# Patient Record
Sex: MALE
[De-identification: names, ages, dates, MRNs, and addresses within clinical notes are randomized per-mention and may not be internally consistent; named-entity substitution may affect disease eponyms.]

---

## 2021-08-02 ENCOUNTER — HOSPITAL ENCOUNTER (OUTPATIENT)
Dept: HOSPITAL 53 - M OUTALCOH | Age: 46
End: 2021-08-02
Attending: PSYCHIATRY & NEUROLOGY
Payer: COMMERCIAL

## 2021-08-02 DIAGNOSIS — F10.10: Primary | ICD-10-CM

## 2021-08-31 ENCOUNTER — HOSPITAL ENCOUNTER (OUTPATIENT)
Dept: HOSPITAL 53 - M OUTALCOH | Age: 46
End: 2021-08-31
Attending: PSYCHIATRY & NEUROLOGY
Payer: COMMERCIAL

## 2021-08-31 DIAGNOSIS — F10.20: Primary | ICD-10-CM

## 2021-11-15 ENCOUNTER — HOSPITAL ENCOUNTER (OUTPATIENT)
Dept: HOSPITAL 53 - M WUC | Age: 46
End: 2021-11-15
Attending: PHYSICIAN ASSISTANT
Payer: COMMERCIAL

## 2021-11-15 DIAGNOSIS — M54.50: Primary | ICD-10-CM

## 2021-11-15 NOTE — REP
INDICATION:

LOW BACK PAIN.



COMPARISON:

None.



TECHNIQUE:

Five views



FINDINGS:

There is moderate disc space narrowing at every level.  There is anterior lipping at

every level.  Degenerative facet joint changes seen bilaterally at every level.

Vertebral body height and alignment is within normal limits.  There is marginal

osteophytosis seen scattered bilaterally.  This is heaviest on the right at L3-4 and

L4-5.  Due to positioning, spondylolysis cannot be accurately evaluated for.





IMPRESSION:

Chronic changes as described above with exam limitations.





<Electronically signed by Nate Hays > 11/15/21 [id number]

## 2021-12-07 ENCOUNTER — HOSPITAL ENCOUNTER (EMERGENCY)
Dept: HOSPITAL 53 - M ED | Age: 46
LOS: 1 days | Discharge: HOME | End: 2021-12-08
Payer: COMMERCIAL

## 2021-12-07 VITALS — WEIGHT: 315 LBS | BODY MASS INDEX: 47.74 KG/M2 | HEIGHT: 68 IN

## 2021-12-07 DIAGNOSIS — I45.19: ICD-10-CM

## 2021-12-07 DIAGNOSIS — J44.1: Primary | ICD-10-CM

## 2021-12-07 DIAGNOSIS — R06.02: ICD-10-CM

## 2021-12-07 DIAGNOSIS — Z87.01: ICD-10-CM

## 2021-12-07 PROCEDURE — 80076 HEPATIC FUNCTION PANEL: CPT

## 2021-12-07 PROCEDURE — 85379 FIBRIN DEGRADATION QUANT: CPT

## 2021-12-07 PROCEDURE — 83880 ASSAY OF NATRIURETIC PEPTIDE: CPT

## 2021-12-07 PROCEDURE — 71046 X-RAY EXAM CHEST 2 VIEWS: CPT

## 2021-12-07 PROCEDURE — 94640 AIRWAY INHALATION TREATMENT: CPT

## 2021-12-07 PROCEDURE — 80048 BASIC METABOLIC PNL TOTAL CA: CPT

## 2021-12-07 PROCEDURE — 99284 EMERGENCY DEPT VISIT MOD MDM: CPT

## 2021-12-07 PROCEDURE — 84484 ASSAY OF TROPONIN QUANT: CPT

## 2021-12-07 PROCEDURE — 93005 ELECTROCARDIOGRAM TRACING: CPT

## 2021-12-07 PROCEDURE — 71275 CT ANGIOGRAPHY CHEST: CPT

## 2021-12-07 PROCEDURE — 85025 COMPLETE CBC W/AUTO DIFF WBC: CPT

## 2021-12-07 PROCEDURE — 83690 ASSAY OF LIPASE: CPT

## 2021-12-08 VITALS — SYSTOLIC BLOOD PRESSURE: 148 MMHG | DIASTOLIC BLOOD PRESSURE: 80 MMHG

## 2021-12-08 LAB
ALBUMIN SERPL BCG-MCNC: 3.4 GM/DL (ref 3.2–5.2)
ALT SERPL W P-5'-P-CCNC: 45 U/L (ref 12–78)
BASOPHILS # BLD AUTO: 0 10^3/UL (ref 0–0.2)
BASOPHILS NFR BLD AUTO: 0.5 % (ref 0–1)
BILIRUB CONJ SERPL-MCNC: 0.2 MG/DL (ref 0–0.2)
BILIRUB SERPL-MCNC: 0.6 MG/DL (ref 0.2–1)
BUN SERPL-MCNC: 16 MG/DL (ref 7–18)
CALCIUM SERPL-MCNC: 8.4 MG/DL (ref 8.5–10.1)
CHLORIDE SERPL-SCNC: 103 MEQ/L (ref 98–107)
CO2 SERPL-SCNC: 31 MEQ/L (ref 21–32)
CREAT SERPL-MCNC: 0.86 MG/DL (ref 0.7–1.3)
EOSINOPHIL # BLD AUTO: 0.3 10^3/UL (ref 0–0.5)
EOSINOPHIL NFR BLD AUTO: 3.6 % (ref 0–3)
GFR SERPL CREATININE-BSD FRML MDRD: > 60 ML/MIN/{1.73_M2} (ref 60–?)
GLUCOSE SERPL-MCNC: 117 MG/DL (ref 70–100)
HCT VFR BLD AUTO: 48.8 % (ref 42–52)
HGB BLD-MCNC: 15.9 G/DL (ref 13.5–17.5)
LIPASE SERPL-CCNC: 118 U/L (ref 73–393)
LYMPHOCYTES # BLD AUTO: 2 10^3/UL (ref 1.5–5)
LYMPHOCYTES NFR BLD AUTO: 23.8 % (ref 24–44)
MCH RBC QN AUTO: 31.4 PG (ref 27–33)
MCHC RBC AUTO-ENTMCNC: 32.6 G/DL (ref 32–36.5)
MCV RBC AUTO: 96.4 FL (ref 80–96)
MONOCYTES # BLD AUTO: 0.6 10^3/UL (ref 0–0.8)
MONOCYTES NFR BLD AUTO: 7.3 % (ref 2–8)
NEUTROPHILS # BLD AUTO: 5.4 10^3/UL (ref 1.5–8.5)
NEUTROPHILS NFR BLD AUTO: 64.1 % (ref 36–66)
NT-PRO BNP: 14 PG/ML (ref ?–125)
PLATELET # BLD AUTO: 163 10^3/UL (ref 150–450)
POTASSIUM SERPL-SCNC: 4.6 MEQ/L (ref 3.5–5.1)
PROT SERPL-MCNC: 7.5 GM/DL (ref 6.4–8.2)
RBC # BLD AUTO: 5.06 10^6/UL (ref 4.3–6.1)
SODIUM SERPL-SCNC: 140 MEQ/L (ref 136–145)
WBC # BLD AUTO: 8.4 10^3/UL (ref 4–10)

## 2021-12-08 NOTE — REP
INDICATION:

elevated dimer/sob/cp



COMPARISON:

None.



TECHNIQUE:

Axial contrast enhanced images from the thoracic inlet to the upper abdomen using

pulmonary embolus technique with multiplanar re-formations.  75 ml Isovue 370

intravenous contrast material administered without complication.



This CT examination was performed using the following dose reduction techniques:

Automated exposure control, adjustment of mA and/or kv according to the patient's

size, and use of iterative reconstruction technique.





FINDINGS:

Suboptimal enhancement of the pulmonary vasculature due to timing along with

respiratory motion artifact limits evaluation.  No obvious main through 2nd order

pulmonary arterial emboli are identified.  Thoracic aorta is without aneurysm or

dissection.  Heart and pericardium are normal.



The lung fields demonstrate subtle perihilar and lower lobe atelectasis and

ground-glass opacities which may reflect early pneumonia.  No effusion.  No

pneumothorax.  Tracheobronchial tree is patent.  No significant adenopathy.

Surrounding musculoskeletal structures are intact.



IMPRESSION:

1. Limited evaluation without obvious pulmonary embolus.

2. Subtle perihilar and lower lobe atelectasis and possible early infiltrates.





<Electronically signed by Lexa Mcrae > 12/08/21 0212

## 2021-12-08 NOTE — ECGEPIP
[facility] - ED

                                       

                                       Test Date:    2021

Pat Name:     VERONIKA GILLILAND            Department:   

Patient ID:   [id number]                 Room:         -

Gender:       Male                     Technician:   MERY

:          1975               Requested By: MANOLO LUTHER

Order Number: [id number]     Reading MD:   Manolo Muro

                                 Measurements

Intervals                              Axis          

Rate:         76                       P:            -16

NM:           134                      QRS:          -51

QRSD:         112                      T:            26

QT:           376                                    

QTc:          423                                    

                           Interpretive Statements

Normal sinus rhythm

Incomplete right bundle branch block

Nonspecific T wave abnormality

Comparison tracing not on file

Electronically Signed on 2021 13:41:17 EST by Manolo Muro

## 2021-12-09 NOTE — ECGEPIP
[facility] - ED

                                       

                                       Test Date:    2021

Pat Name:     VERONIKA GILLILAND            Department:   

Patient ID:   [id number]                 Room:         -

Gender:       Male                     Technician:   LEMUEL

:          1975               Requested By: MITCHEL PALMA PA-C

Order Number: [id number]     Reading MD:   Santa Kurtz

                                 Measurements

Intervals                              Axis          

Rate:         75                       P:            -10

LA:           142                      QRS:          -24

QRSD:         94                       T:            33

QT:           384                                    

QTc:          428                                    

                           Interpretive Statements

Normal sinus rhythm

irbbb

nsttw abnormality

similar 21

Electronically Signed on 2021 18:49:59 EST by Santa Kurtz

## 2022-05-07 ENCOUNTER — HOSPITAL ENCOUNTER (EMERGENCY)
Dept: HOSPITAL 53 - M ED | Age: 47
Discharge: LEFT BEFORE DISCHARGE | End: 2022-05-07
Payer: COMMERCIAL

## 2022-05-07 VITALS — SYSTOLIC BLOOD PRESSURE: 141 MMHG | DIASTOLIC BLOOD PRESSURE: 93 MMHG

## 2022-05-07 DIAGNOSIS — Z53.21: Primary | ICD-10-CM

## 2023-01-06 ENCOUNTER — HOSPITAL ENCOUNTER (OUTPATIENT)
Dept: HOSPITAL 53 - M LAB REF | Age: 48
End: 2023-01-06
Payer: COMMERCIAL

## 2023-01-06 DIAGNOSIS — Z13.228: ICD-10-CM

## 2023-01-06 DIAGNOSIS — E11.69: ICD-10-CM

## 2023-01-06 DIAGNOSIS — E11.43: ICD-10-CM

## 2023-01-06 DIAGNOSIS — E11.8: ICD-10-CM

## 2023-01-06 DIAGNOSIS — E11.42: ICD-10-CM

## 2023-01-06 DIAGNOSIS — E11.22: ICD-10-CM

## 2023-01-06 DIAGNOSIS — E11.21: Primary | ICD-10-CM

## 2023-01-06 LAB
25(OH)D3 SERPL-MCNC: 22.5 NG/ML (ref 20–100)
ALBUMIN SERPL BCG-MCNC: 4 G/DL (ref 3.2–5.2)
ALP SERPL-CCNC: 121 U/L (ref 46–116)
ALT SERPL W P-5'-P-CCNC: 27 U/L (ref 7–40)
AST SERPL-CCNC: 23 U/L (ref ?–34)
BILIRUB SERPL-MCNC: 0.4 MG/DL (ref 0.3–1.2)
BUN SERPL-MCNC: 19 MG/DL (ref 9–23)
CALCIUM SERPL-MCNC: 8.9 MG/DL (ref 8.5–10.1)
CHLORIDE SERPL-SCNC: 103 MMOL/L (ref 98–107)
CHOLEST SERPL-MCNC: 160 MG/DL (ref ?–200)
CHOLEST/HDLC SERPL: 3.53 {RATIO} (ref ?–5)
CO2 SERPL-SCNC: 27 MMOL/L (ref 20–31)
CREAT SERPL-MCNC: 0.65 MG/DL (ref 0.7–1.3)
CREAT UR-MCNC: 137 MG/DL
GFR SERPL CREATININE-BSD FRML MDRD: > 60 ML/MIN/{1.73_M2} (ref 60–?)
GLUCOSE SERPL-MCNC: 84 MG/DL (ref 60–100)
HCT VFR BLD AUTO: 46.9 % (ref 42–52)
HDLC SERPL-MCNC: 45.2 MG/DL (ref 40–?)
HGB BLD-MCNC: 15.8 G/DL (ref 13.5–17.5)
LDLC SERPL CALC-MCNC: 90.2 MG/DL (ref ?–100)
MCH RBC QN AUTO: 30.9 PG (ref 27–33)
MCHC RBC AUTO-ENTMCNC: 33.7 G/DL (ref 32–36.5)
MCV RBC AUTO: 91.6 FL (ref 80–96)
MICROALBUMIN UR-MCNC: 16 MG/DL
MICROALBUMIN/CREAT UR: 11.6 MCG/MG (ref 0–30)
NONHDLC SERPL-MCNC: 115 MG/DL
PLATELET # BLD AUTO: 168 10^3/UL (ref 150–450)
POTASSIUM SERPL-SCNC: 4.1 MMOL/L (ref 3.5–5.1)
PROT SERPL-MCNC: 7.6 G/DL (ref 5.7–8.2)
RBC # BLD AUTO: 5.12 10^6/UL (ref 4.3–6.1)
SODIUM SERPL-SCNC: 140 MMOL/L (ref 136–145)
TRIGL SERPL-MCNC: 123 MG/DL (ref ?–150)
TSH SERPL DL<=0.005 MIU/L-ACNC: 2.34 UIU/ML (ref 0.55–4.78)
WBC # BLD AUTO: 10.4 10^3/UL (ref 4–10)

## 2023-01-11 ENCOUNTER — HOSPITAL ENCOUNTER (OUTPATIENT)
Dept: HOSPITAL 53 - M OUTALCOH | Age: 48
End: 2023-01-11
Attending: PSYCHIATRY & NEUROLOGY
Payer: MEDICAID

## 2023-01-11 DIAGNOSIS — Z02.9: Primary | ICD-10-CM

## 2023-01-26 ENCOUNTER — HOSPITAL ENCOUNTER (OUTPATIENT)
Dept: HOSPITAL 53 - M OUTALCOH | Age: 48
LOS: 5 days | End: 2023-01-31
Attending: PSYCHIATRY & NEUROLOGY
Payer: MEDICAID

## 2023-01-26 DIAGNOSIS — F10.20: Primary | ICD-10-CM

## 2023-02-23 ENCOUNTER — HOSPITAL ENCOUNTER (OUTPATIENT)
Dept: HOSPITAL 53 - M OUTALCOH | Age: 48
LOS: 5 days | End: 2023-02-28
Attending: PSYCHIATRY & NEUROLOGY
Payer: MEDICAID

## 2023-02-23 DIAGNOSIS — F10.20: Primary | ICD-10-CM

## 2023-03-30 ENCOUNTER — HOSPITAL ENCOUNTER (OUTPATIENT)
Dept: HOSPITAL 53 - M OUTALCOH | Age: 48
LOS: 1 days | End: 2023-03-31
Attending: PSYCHIATRY & NEUROLOGY
Payer: MEDICAID

## 2023-03-30 DIAGNOSIS — F10.20: Primary | ICD-10-CM

## 2023-04-26 ENCOUNTER — HOSPITAL ENCOUNTER (OUTPATIENT)
Dept: HOSPITAL 53 - M OUTALCOH | Age: 48
LOS: 4 days | End: 2023-04-30
Attending: PSYCHIATRY & NEUROLOGY
Payer: MEDICAID

## 2023-04-26 DIAGNOSIS — F10.20: Primary | ICD-10-CM

## 2024-12-02 NOTE — REP
INDICATION:

CHEST PAIN



COMPARISON:

None.



TECHNIQUE:

PA and lateral.



FINDINGS:

The mediastinum and cardiac silhouette are normal.  The lung fields are clear and

without acute consolidation, effusion, or pneumothorax.  The skeletal structures are

intact and normal.



IMPRESSION:

No acute cardiopulmonary process.





<Electronically signed by Lexa Mcrae > 12/08/21 0832 No